# Patient Record
Sex: FEMALE | Race: OTHER | NOT HISPANIC OR LATINO
[De-identification: names, ages, dates, MRNs, and addresses within clinical notes are randomized per-mention and may not be internally consistent; named-entity substitution may affect disease eponyms.]

---

## 2017-03-05 ENCOUNTER — LABORATORY RESULT (OUTPATIENT)
Age: 62
End: 2017-03-05

## 2017-03-06 ENCOUNTER — APPOINTMENT (OUTPATIENT)
Dept: PULMONOLOGY | Facility: CLINIC | Age: 62
End: 2017-03-06

## 2017-03-06 VITALS
WEIGHT: 157 LBS | HEIGHT: 65 IN | OXYGEN SATURATION: 96 % | RESPIRATION RATE: 12 BRPM | SYSTOLIC BLOOD PRESSURE: 108 MMHG | DIASTOLIC BLOOD PRESSURE: 72 MMHG | HEART RATE: 65 BPM | BODY MASS INDEX: 26.16 KG/M2 | TEMPERATURE: 98.6 F

## 2017-03-06 DIAGNOSIS — R06.02 SHORTNESS OF BREATH: ICD-10-CM

## 2017-03-06 DIAGNOSIS — R06.09 OTHER FORMS OF DYSPNEA: ICD-10-CM

## 2017-03-07 LAB
ALBUMIN SERPL ELPH-MCNC: 4.6 G/DL
ALP BLD-CCNC: 97 U/L
ALT SERPL-CCNC: 17 U/L
ANION GAP SERPL CALC-SCNC: 13 MMOL/L
APPEARANCE: CLEAR
AST SERPL-CCNC: 20 U/L
BACTERIA: NEGATIVE
BASOPHILS # BLD AUTO: 0.01 K/UL
BASOPHILS NFR BLD AUTO: 0.2 %
BILIRUB SERPL-MCNC: 0.3 MG/DL
BILIRUBIN URINE: NEGATIVE
BLOOD URINE: NEGATIVE
BUN SERPL-MCNC: 13 MG/DL
CALCIUM SERPL-MCNC: 10 MG/DL
CHLORIDE SERPL-SCNC: 101 MMOL/L
CHOLEST SERPL-MCNC: 229 MG/DL
CHOLEST/HDLC SERPL: 4.2 RATIO
CO2 SERPL-SCNC: 26 MMOL/L
COLOR: YELLOW
CREAT SERPL-MCNC: 0.76 MG/DL
EOSINOPHIL # BLD AUTO: 0.06 K/UL
EOSINOPHIL NFR BLD AUTO: 1 %
GLUCOSE QUALITATIVE U: NORMAL MG/DL
GLUCOSE SERPL-MCNC: 83 MG/DL
HBA1C MFR BLD HPLC: 5.4 %
HCT VFR BLD CALC: 35.2 %
HDLC SERPL-MCNC: 55 MG/DL
HGB BLD-MCNC: 11.3 G/DL
HYALINE CASTS: 0 /LPF
IMM GRANULOCYTES NFR BLD AUTO: 0.2 %
KETONES URINE: NEGATIVE
LDLC SERPL CALC-MCNC: 149 MG/DL
LEUKOCYTE ESTERASE URINE: NEGATIVE
LYMPHOCYTES # BLD AUTO: 2.82 K/UL
LYMPHOCYTES NFR BLD AUTO: 46.3 %
MAN DIFF?: NORMAL
MCHC RBC-ENTMCNC: 21.4 PG
MCHC RBC-ENTMCNC: 32.1 GM/DL
MCV RBC AUTO: 66.8 FL
MICROSCOPIC-UA: NORMAL
MONOCYTES # BLD AUTO: 0.31 K/UL
MONOCYTES NFR BLD AUTO: 5.1 %
NEUTROPHILS # BLD AUTO: 2.88 K/UL
NEUTROPHILS NFR BLD AUTO: 47.2 %
NITRITE URINE: NEGATIVE
PH URINE: 5.5
PLATELET # BLD AUTO: 255 K/UL
POTASSIUM SERPL-SCNC: 4.4 MMOL/L
PROT SERPL-MCNC: 7.4 G/DL
PROTEIN URINE: NEGATIVE MG/DL
RBC # BLD: 5.27 M/UL
RBC # FLD: 15.8 %
RED BLOOD CELLS URINE: 0 /HPF
SODIUM SERPL-SCNC: 140 MMOL/L
SPECIFIC GRAVITY URINE: 1.01
SQUAMOUS EPITHELIAL CELLS: 1 /HPF
T4 SERPL-MCNC: 6.5 UG/DL
TRIGL SERPL-MCNC: 125 MG/DL
TSH SERPL-ACNC: 2.54 UIU/ML
UROBILINOGEN URINE: NORMAL MG/DL
WBC # FLD AUTO: 6.09 K/UL
WHITE BLOOD CELLS URINE: 0 /HPF

## 2017-04-19 ENCOUNTER — APPOINTMENT (OUTPATIENT)
Dept: INTERNAL MEDICINE | Facility: CLINIC | Age: 62
End: 2017-04-19

## 2017-07-17 ENCOUNTER — APPOINTMENT (OUTPATIENT)
Dept: INTERNAL MEDICINE | Facility: CLINIC | Age: 62
End: 2017-07-17

## 2017-07-17 VITALS
WEIGHT: 152 LBS | RESPIRATION RATE: 12 BRPM | DIASTOLIC BLOOD PRESSURE: 72 MMHG | HEART RATE: 67 BPM | SYSTOLIC BLOOD PRESSURE: 117 MMHG | BODY MASS INDEX: 25.33 KG/M2 | TEMPERATURE: 98.5 F | HEIGHT: 65 IN | OXYGEN SATURATION: 96 %

## 2017-07-18 ENCOUNTER — MEDICATION RENEWAL (OUTPATIENT)
Age: 62
End: 2017-07-18

## 2017-07-19 LAB
CHOLEST SERPL-MCNC: 238 MG/DL
CHOLEST/HDLC SERPL: 4.8 RATIO
HDLC SERPL-MCNC: 50 MG/DL
LDLC SERPL CALC-MCNC: 158 MG/DL
TRIGL SERPL-MCNC: 152 MG/DL

## 2018-04-02 ENCOUNTER — APPOINTMENT (OUTPATIENT)
Dept: PULMONOLOGY | Facility: CLINIC | Age: 63
End: 2018-04-02
Payer: MEDICAID

## 2018-04-02 VITALS
TEMPERATURE: 98.2 F | HEIGHT: 65 IN | RESPIRATION RATE: 12 BRPM | WEIGHT: 149 LBS | OXYGEN SATURATION: 97 % | HEART RATE: 89 BPM | SYSTOLIC BLOOD PRESSURE: 121 MMHG | DIASTOLIC BLOOD PRESSURE: 73 MMHG | BODY MASS INDEX: 24.83 KG/M2

## 2018-04-02 DIAGNOSIS — F32.9 MAJOR DEPRESSIVE DISORDER, SINGLE EPISODE, UNSPECIFIED: ICD-10-CM

## 2018-04-02 DIAGNOSIS — Z82.49 FAMILY HISTORY OF ISCHEMIC HEART DISEASE AND OTHER DISEASES OF THE CIRCULATORY SYSTEM: ICD-10-CM

## 2018-04-02 DIAGNOSIS — J98.8 OTHER SPECIFIED RESPIRATORY DISORDERS: ICD-10-CM

## 2018-04-02 DIAGNOSIS — Z87.09 PERSONAL HISTORY OF OTHER DISEASES OF THE RESPIRATORY SYSTEM: ICD-10-CM

## 2018-04-02 PROCEDURE — 99214 OFFICE O/P EST MOD 30 MIN: CPT

## 2019-01-09 ENCOUNTER — LABORATORY RESULT (OUTPATIENT)
Age: 64
End: 2019-01-09

## 2019-01-09 ENCOUNTER — APPOINTMENT (OUTPATIENT)
Dept: INTERNAL MEDICINE | Facility: CLINIC | Age: 64
End: 2019-01-09
Payer: COMMERCIAL

## 2019-01-09 VITALS
HEART RATE: 67 BPM | WEIGHT: 162 LBS | BODY MASS INDEX: 26.99 KG/M2 | OXYGEN SATURATION: 98 % | SYSTOLIC BLOOD PRESSURE: 116 MMHG | DIASTOLIC BLOOD PRESSURE: 80 MMHG | RESPIRATION RATE: 12 BRPM | TEMPERATURE: 98.2 F | HEIGHT: 65 IN

## 2019-01-09 DIAGNOSIS — B35.1 TINEA UNGUIUM: ICD-10-CM

## 2019-01-09 PROCEDURE — 99213 OFFICE O/P EST LOW 20 MIN: CPT | Mod: 25

## 2019-01-09 PROCEDURE — 99397 PER PM REEVAL EST PAT 65+ YR: CPT

## 2019-01-14 LAB
ALBUMIN SERPL ELPH-MCNC: 4.5 G/DL
ALP BLD-CCNC: 85 U/L
ALT SERPL-CCNC: 15 U/L
ANION GAP SERPL CALC-SCNC: 11 MMOL/L
APPEARANCE: CLEAR
AST SERPL-CCNC: 22 U/L
BILIRUB SERPL-MCNC: 0.3 MG/DL
BILIRUBIN URINE: NEGATIVE
BLOOD URINE: NEGATIVE
BUN SERPL-MCNC: 11 MG/DL
CALCIUM SERPL-MCNC: 9.4 MG/DL
CHLORIDE SERPL-SCNC: 103 MMOL/L
CO2 SERPL-SCNC: 25 MMOL/L
COLOR: YELLOW
CREAT SERPL-MCNC: 0.57 MG/DL
GLUCOSE QUALITATIVE U: NEGATIVE MG/DL
GLUCOSE SERPL-MCNC: 72 MG/DL
HBA1C MFR BLD HPLC: 5.4 %
KETONES URINE: NEGATIVE
LEUKOCYTE ESTERASE URINE: NEGATIVE
NITRITE URINE: NEGATIVE
PH URINE: 7.5
POTASSIUM SERPL-SCNC: 5 MMOL/L
PROT SERPL-MCNC: 7.5 G/DL
PROTEIN URINE: NEGATIVE MG/DL
SODIUM SERPL-SCNC: 139 MMOL/L
SPECIFIC GRAVITY URINE: 1.01
TSH SERPL-ACNC: 2.61 UIU/ML
UROBILINOGEN URINE: NEGATIVE MG/DL
VIT B12 SERPL-MCNC: 414 PG/ML

## 2019-01-14 NOTE — PHYSICAL EXAM
[No Acute Distress] : no acute distress [Well Nourished] : well nourished [Well Developed] : well developed [Well-Appearing] : well-appearing [Normal Sclera/Conjunctiva] : normal sclera/conjunctiva [PERRL] : pupils equal round and reactive to light [EOMI] : extraocular movements intact [Normal Outer Ear/Nose] : the outer ears and nose were normal in appearance [Normal Oropharynx] : the oropharynx was normal [No JVD] : no jugular venous distention [Supple] : supple [No Lymphadenopathy] : no lymphadenopathy [Thyroid Normal, No Nodules] : the thyroid was normal and there were no nodules present [No Respiratory Distress] : no respiratory distress  [Clear to Auscultation] : lungs were clear to auscultation bilaterally [No Accessory Muscle Use] : no accessory muscle use [Normal Rate] : normal rate  [Regular Rhythm] : with a regular rhythm [Normal S1, S2] : normal S1 and S2 [No Murmur] : no murmur heard [No Edema] : there was no peripheral edema [No Extremity Clubbing/Cyanosis] : no extremity clubbing/cyanosis [Soft] : abdomen soft [Non Tender] : non-tender [Non-distended] : non-distended [No Masses] : no abdominal mass palpated [No HSM] : no HSM [Normal Bowel Sounds] : normal bowel sounds [Normal Posterior Cervical Nodes] : no posterior cervical lymphadenopathy [Normal Anterior Cervical Nodes] : no anterior cervical lymphadenopathy [No CVA Tenderness] : no CVA  tenderness [No Spinal Tenderness] : no spinal tenderness [No Joint Swelling] : no joint swelling [Grossly Normal Strength/Tone] : grossly normal strength/tone [No Rash] : no rash [Normal Gait] : normal gait [Coordination Grossly Intact] : coordination grossly intact [No Focal Deficits] : no focal deficits [Normal Affect] : the affect was normal [Normal Insight/Judgement] : insight and judgment were intact [de-identified] : rt 4th fingernail onychomycosis

## 2019-01-14 NOTE — HISTORY OF PRESENT ILLNESS
[de-identified] : 62 yo female, with Hx of migraines, osteoporosis, hypercholesterolemia, presents for annual physical.\par States she is doing well. C/o rt fingernail having a little discoloration and splitting that she has noticed for a while now

## 2019-01-14 NOTE — HEALTH RISK ASSESSMENT
[With Family] : lives with family [] :  [# Of Children ___] : has [unfilled] children [Sexually Active] : sexually active [] : No [MammogramDate] : Aug 2017 [PapSmearDate] : > one year ago [BoneDensityDate] : 2017 [ColonoscopyDate] : 3 years ago

## 2019-01-14 NOTE — ASSESSMENT
[FreeTextEntry1] : Physical\par blood work ordered\par Does not want flu vaccine, pneumonia vaccine, shingles vaccine\par referred for mammography, gyn, and to GI for colonoscopy-pt states she is due for repeat colonoscopy in 3 years\par She is UTD with her Bone Density scan\par \par rt 4th finger onychomycosis\par [place] sol'n\par \par  Hx of migraines, osteoporosis, hypercholesterolemia\par cont current meds\par Rxs renewed-see plan\par \par f/u in one week for lab results

## 2019-01-23 LAB
25(OH)D3 SERPL-MCNC: 14.3 NG/ML
CHOLEST SERPL-MCNC: 242 MG/DL
CHOLEST/HDLC SERPL: 4.6 RATIO
HDLC SERPL-MCNC: 53 MG/DL
LDLC SERPL CALC-MCNC: 169 MG/DL
TRIGL SERPL-MCNC: 98 MG/DL

## 2019-01-25 LAB
BASOPHILS # BLD AUTO: 0.03 K/UL
BASOPHILS NFR BLD AUTO: 0.6 %
EOSINOPHIL # BLD AUTO: 0.08 K/UL
EOSINOPHIL NFR BLD AUTO: 1.6 %
HCT VFR BLD CALC: 32.9 %
HGB BLD-MCNC: 10.4 G/DL
IMM GRANULOCYTES NFR BLD AUTO: 0 %
LYMPHOCYTES # BLD AUTO: 2.61 K/UL
LYMPHOCYTES NFR BLD AUTO: 51 %
MAN DIFF?: NORMAL
MCHC RBC-ENTMCNC: 20.6 PG
MCHC RBC-ENTMCNC: 31.6 GM/DL
MCV RBC AUTO: 65.3 FL
MONOCYTES # BLD AUTO: 0.41 K/UL
MONOCYTES NFR BLD AUTO: 8 %
NEUTROPHILS # BLD AUTO: 1.99 K/UL
NEUTROPHILS NFR BLD AUTO: 38.8 %
PLATELET # BLD AUTO: 293 K/UL
RBC # BLD: 5.04 M/UL
RBC # FLD: 15.8 %
WBC # FLD AUTO: 5.12 K/UL

## 2019-05-29 ENCOUNTER — RX RENEWAL (OUTPATIENT)
Age: 64
End: 2019-05-29

## 2019-09-17 ENCOUNTER — LABORATORY RESULT (OUTPATIENT)
Age: 64
End: 2019-09-17

## 2019-09-17 ENCOUNTER — APPOINTMENT (OUTPATIENT)
Dept: INTERNAL MEDICINE | Facility: CLINIC | Age: 64
End: 2019-09-17
Payer: COMMERCIAL

## 2019-09-17 VITALS
SYSTOLIC BLOOD PRESSURE: 120 MMHG | OXYGEN SATURATION: 97 % | BODY MASS INDEX: 26.66 KG/M2 | RESPIRATION RATE: 12 BRPM | HEART RATE: 80 BPM | TEMPERATURE: 98.5 F | DIASTOLIC BLOOD PRESSURE: 68 MMHG | HEIGHT: 65 IN | WEIGHT: 160 LBS

## 2019-09-17 DIAGNOSIS — E78.00 PURE HYPERCHOLESTEROLEMIA, UNSPECIFIED: ICD-10-CM

## 2019-09-17 PROCEDURE — 99214 OFFICE O/P EST MOD 30 MIN: CPT

## 2019-09-18 LAB
APPEARANCE: CLEAR
BILIRUBIN URINE: NEGATIVE
BLOOD URINE: NEGATIVE
COLOR: NORMAL
GLUCOSE QUALITATIVE U: NEGATIVE
KETONES URINE: NEGATIVE
LEUKOCYTE ESTERASE URINE: NEGATIVE
NITRITE URINE: NEGATIVE
PH URINE: 8
PROTEIN URINE: NEGATIVE
SPECIFIC GRAVITY URINE: 1.01
UROBILINOGEN URINE: NORMAL

## 2019-09-26 LAB
ALBUMIN SERPL ELPH-MCNC: 4.4 G/DL
ALP BLD-CCNC: 74 U/L
ALT SERPL-CCNC: 18 U/L
ANION GAP SERPL CALC-SCNC: 15 MMOL/L
AST SERPL-CCNC: 24 U/L
BASOPHILS # BLD AUTO: 0.05 K/UL
BASOPHILS NFR BLD AUTO: 0.7 %
BILIRUB SERPL-MCNC: 0.2 MG/DL
BUN SERPL-MCNC: 10 MG/DL
CALCIUM SERPL-MCNC: 10 MG/DL
CHLORIDE SERPL-SCNC: 103 MMOL/L
CO2 SERPL-SCNC: 24 MMOL/L
CREAT SERPL-MCNC: 0.72 MG/DL
EOSINOPHIL # BLD AUTO: 0.13 K/UL
EOSINOPHIL NFR BLD AUTO: 1.8 %
ESTIMATED AVERAGE GLUCOSE: 108 MG/DL
GLUCOSE SERPL-MCNC: 88 MG/DL
H.PYLORI ANTIBODY IGA: 12.9 UNITS
HBA1C MFR BLD HPLC: 5.4 %
HCT VFR BLD CALC: 34.6 %
HGB BLD-MCNC: 10.7 G/DL
IMM GRANULOCYTES NFR BLD AUTO: 0.1 %
LYMPHOCYTES # BLD AUTO: 3.68 K/UL
LYMPHOCYTES NFR BLD AUTO: 49.6 %
MAN DIFF?: NORMAL
MCHC RBC-ENTMCNC: 20.6 PG
MCHC RBC-ENTMCNC: 30.9 GM/DL
MCV RBC AUTO: 66.7 FL
MONOCYTES # BLD AUTO: 0.51 K/UL
MONOCYTES NFR BLD AUTO: 6.9 %
NEUTROPHILS # BLD AUTO: 3.04 K/UL
NEUTROPHILS NFR BLD AUTO: 40.9 %
PLATELET # BLD AUTO: 275 K/UL
POTASSIUM SERPL-SCNC: 4.3 MMOL/L
PROT SERPL-MCNC: 7.3 G/DL
RBC # BLD: 5.19 M/UL
RBC # FLD: 15.8 %
SODIUM SERPL-SCNC: 142 MMOL/L
WBC # FLD AUTO: 7.42 K/UL

## 2019-09-26 NOTE — PHYSICAL EXAM
[Normal Sclera/Conjunctiva] : normal sclera/conjunctiva [Normal Outer Ear/Nose] : the outer ears and nose were normal in appearance [No JVD] : no jugular venous distention [Normal] : normal rate, regular rhythm, normal S1 and S2 and no murmur heard [No Edema] : there was no peripheral edema [Soft] : abdomen soft [Non Tender] : non-tender [Non-distended] : non-distended [Normal Anterior Cervical Nodes] : no anterior cervical lymphadenopathy [No CVA Tenderness] : no CVA  tenderness [No Joint Swelling] : no joint swelling [Grossly Normal Strength/Tone] : grossly normal strength/tone [No Rash] : no rash [No Focal Deficits] : no focal deficits [Normal Gait] : normal gait [Normal Affect] : the affect was normal [Alert and Oriented x3] : oriented to person, place, and time [Normal Insight/Judgement] : insight and judgment were intact

## 2019-09-26 NOTE — HISTORY OF PRESENT ILLNESS
[de-identified] : 62 yo female, with hx of hypercholesterolemia, osteoporosis, migraines, thalassemia trait, presents for follow up visit and blood work\par Pt states that she took lipitor only for a short while and stopped it. She is following a low fat/low cholesterol diet\par Also states that last week she had bloating and also says she had dark discoloration in her urine and had some dysuria that lasted about 3 days. Now she says the symptoms have gone away\par She also says she developed a yeast infection. Had Diflucan at home and she took it

## 2019-10-10 LAB
CHOLEST SERPL-MCNC: 240 MG/DL
CHOLEST/HDLC SERPL: 4.8 RATIO
HDLC SERPL-MCNC: 50 MG/DL
LDLC SERPL CALC-MCNC: 135 MG/DL
TRIGL SERPL-MCNC: 274 MG/DL

## 2020-07-14 ENCOUNTER — APPOINTMENT (OUTPATIENT)
Dept: OBGYN | Facility: CLINIC | Age: 65
End: 2020-07-14
Payer: MEDICARE

## 2020-07-14 VITALS
WEIGHT: 148.13 LBS | SYSTOLIC BLOOD PRESSURE: 122 MMHG | HEART RATE: 89 BPM | BODY MASS INDEX: 24.68 KG/M2 | TEMPERATURE: 98.9 F | HEIGHT: 65 IN | DIASTOLIC BLOOD PRESSURE: 78 MMHG

## 2020-07-14 DIAGNOSIS — B97.7 PAPILLOMAVIRUS AS THE CAUSE OF DISEASES CLASSIFIED ELSEWHERE: ICD-10-CM

## 2020-07-14 DIAGNOSIS — R92.2 INCONCLUSIVE MAMMOGRAM: ICD-10-CM

## 2020-07-14 PROCEDURE — 99203 OFFICE O/P NEW LOW 30 MIN: CPT

## 2020-07-14 NOTE — HISTORY OF PRESENT ILLNESS
[___ Year(s) Ago] : [unfilled] year(s) ago [Regular Exercise] : regular exercise [Fair] : being in fair health [Weight Concerns] : no concerns with her weight [Healthy Diet] : a healthy diet [Last Pap Smear ___] : last Papanicolaou cytology done [unfilled] [Last Bone Density ___] : Last bone density studies [unfilled] [Last Colonoscopy ___] : last colonoscopy done [unfilled] [Last Mammogram ___] : last mammogram done [unfilled] [Vaginal Itching] : vaginal itching [Postmenopausal] : is postmenopausal [Sexually Active] : is sexually active [Dyspareunia] : dyspareunia [___ Months] : that started [unfilled] months ago [6/10] : described as 6/10 in severity

## 2020-07-14 NOTE — PHYSICAL EXAM
[Awake] : awake [Alert] : alert [Acute Distress] : no acute distress [Mass] : no breast mass [Nipple Discharge] : no nipple discharge [Axillary LAD] : no axillary lymphadenopathy [Soft] : soft [Oriented x3] : oriented to person, place, and time [Tender] : non tender [Atrophy] : atrophy [Normal] : cervix [Uterine Adnexae] : were not tender and not enlarged [No Bleeding] : there was no active vaginal bleeding [Pap Obtained] : a Pap smear was performed

## 2020-07-16 LAB — HPV HIGH+LOW RISK DNA PNL CVX: NOT DETECTED

## 2020-07-20 LAB — CYTOLOGY CVX/VAG DOC THIN PREP: ABNORMAL

## 2020-08-12 ENCOUNTER — LABORATORY RESULT (OUTPATIENT)
Age: 65
End: 2020-08-12

## 2020-08-12 ENCOUNTER — APPOINTMENT (OUTPATIENT)
Dept: INTERNAL MEDICINE | Facility: CLINIC | Age: 65
End: 2020-08-12
Payer: MEDICARE

## 2020-08-12 VITALS
WEIGHT: 144 LBS | BODY MASS INDEX: 23.99 KG/M2 | RESPIRATION RATE: 12 BRPM | TEMPERATURE: 97.8 F | HEART RATE: 80 BPM | SYSTOLIC BLOOD PRESSURE: 113 MMHG | HEIGHT: 65 IN | DIASTOLIC BLOOD PRESSURE: 77 MMHG | OXYGEN SATURATION: 96 %

## 2020-08-12 DIAGNOSIS — Z00.00 ENCOUNTER FOR GENERAL ADULT MEDICAL EXAMINATION W/OUT ABNORMAL FINDINGS: ICD-10-CM

## 2020-08-12 PROCEDURE — 99397 PER PM REEVAL EST PAT 65+ YR: CPT

## 2020-08-12 RX ORDER — CICLOPIROX 80 MG/ML
8 SOLUTION TOPICAL
Qty: 1 | Refills: 3 | Status: DISCONTINUED | COMMUNITY
Start: 2019-01-09 | End: 2020-08-12

## 2020-08-12 RX ORDER — DICYCLOMINE HYDROCHLORIDE 20 MG/1
20 TABLET ORAL TWICE DAILY
Qty: 60 | Refills: 3 | Status: DISCONTINUED | COMMUNITY
Start: 2019-01-09 | End: 2020-08-12

## 2020-08-12 RX ORDER — ALPRAZOLAM 0.25 MG/1
0.25 TABLET ORAL
Qty: 90 | Refills: 0 | Status: DISCONTINUED | COMMUNITY
Start: 2017-03-06 | End: 2020-08-12

## 2020-08-12 RX ORDER — FLUCONAZOLE 150 MG/1
150 TABLET ORAL
Qty: 3 | Refills: 3 | Status: DISCONTINUED | COMMUNITY
Start: 2019-09-17 | End: 2020-08-12

## 2020-08-12 RX ORDER — AMOXICILLIN AND CLAVULANATE POTASSIUM 500; 125 MG/1; MG/1
500-125 TABLET, FILM COATED ORAL
Qty: 14 | Refills: 0 | Status: DISCONTINUED | COMMUNITY
Start: 2018-04-02 | End: 2020-08-12

## 2020-08-12 RX ORDER — HYDROCODONE BITARTRATE AND HOMATROPINE METHYLBROMIDE 5; 1.5 MG/5ML; MG/5ML
5-1.5 SYRUP ORAL EVERY 8 HOURS
Qty: 480 | Refills: 0 | Status: DISCONTINUED | COMMUNITY
Start: 2018-04-02 | End: 2020-08-12

## 2020-08-12 RX ORDER — ATORVASTATIN CALCIUM 10 MG/1
10 TABLET, FILM COATED ORAL
Qty: 90 | Refills: 0 | Status: DISCONTINUED | COMMUNITY
Start: 2019-01-24 | End: 2020-08-12

## 2020-08-12 RX ORDER — DICLOFENAC SODIUM 10 MG/G
1 GEL TOPICAL DAILY
Qty: 60 | Refills: 3 | Status: DISCONTINUED | COMMUNITY
Start: 2017-03-06 | End: 2020-08-12

## 2020-08-12 NOTE — PHYSICAL EXAM
[No Acute Distress] : no acute distress [Well Nourished] : well nourished [Well Developed] : well developed [Well-Appearing] : well-appearing [Normal Sclera/Conjunctiva] : normal sclera/conjunctiva [EOMI] : extraocular movements intact [PERRL] : pupils equal round and reactive to light [Normal Oropharynx] : the oropharynx was normal [Normal Outer Ear/Nose] : the outer ears and nose were normal in appearance [No JVD] : no jugular venous distention [No Lymphadenopathy] : no lymphadenopathy [Supple] : supple [Thyroid Normal, No Nodules] : the thyroid was normal and there were no nodules present [No Respiratory Distress] : no respiratory distress  [No Accessory Muscle Use] : no accessory muscle use [Clear to Auscultation] : lungs were clear to auscultation bilaterally [Normal Rate] : normal rate  [Regular Rhythm] : with a regular rhythm [No Murmur] : no murmur heard [Normal S1, S2] : normal S1 and S2 [No Edema] : there was no peripheral edema [Pedal Pulses Present] : the pedal pulses are present [No Extremity Clubbing/Cyanosis] : no extremity clubbing/cyanosis [Non-distended] : non-distended [Non Tender] : non-tender [Soft] : abdomen soft [No HSM] : no HSM [No Masses] : no abdominal mass palpated [Normal Anterior Cervical Nodes] : no anterior cervical lymphadenopathy [Normal Posterior Cervical Nodes] : no posterior cervical lymphadenopathy [Normal Bowel Sounds] : normal bowel sounds [No CVA Tenderness] : no CVA  tenderness [No Spinal Tenderness] : no spinal tenderness [No Joint Swelling] : no joint swelling [Grossly Normal Strength/Tone] : grossly normal strength/tone [No Rash] : no rash [Normal Gait] : normal gait [No Focal Deficits] : no focal deficits [Coordination Grossly Intact] : coordination grossly intact [Normal Affect] : the affect was normal [Alert and Oriented x3] : oriented to person, place, and time [Normal Insight/Judgement] : insight and judgment were intact [Normal] : affect was normal and insight and judgment were intact

## 2020-08-22 LAB
25(OH)D3 SERPL-MCNC: 35.7 NG/ML
ALBUMIN SERPL ELPH-MCNC: 4.6 G/DL
ALP BLD-CCNC: 71 U/L
ALT SERPL-CCNC: 15 U/L
ANION GAP SERPL CALC-SCNC: 16 MMOL/L
APPEARANCE: CLEAR
AST SERPL-CCNC: 21 U/L
BILIRUB SERPL-MCNC: 0.4 MG/DL
BILIRUBIN URINE: NEGATIVE
BLOOD URINE: NEGATIVE
BUN SERPL-MCNC: 6 MG/DL
CALCIUM SERPL-MCNC: 9.5 MG/DL
CHLORIDE SERPL-SCNC: 100 MMOL/L
CHOLEST SERPL-MCNC: 230 MG/DL
CHOLEST/HDLC SERPL: 4.4 RATIO
CO2 SERPL-SCNC: 22 MMOL/L
COLOR: NORMAL
CREAT SERPL-MCNC: 0.72 MG/DL
ESTIMATED AVERAGE GLUCOSE: 105 MG/DL
GLUCOSE QUALITATIVE U: NEGATIVE
GLUCOSE SERPL-MCNC: 74 MG/DL
HBA1C MFR BLD HPLC: 5.3 %
HDLC SERPL-MCNC: 52 MG/DL
KETONES URINE: NEGATIVE
LDLC SERPL CALC-MCNC: 154 MG/DL
LEUKOCYTE ESTERASE URINE: ABNORMAL
NITRITE URINE: NEGATIVE
PH URINE: 7.5
POTASSIUM SERPL-SCNC: 4.2 MMOL/L
PROT SERPL-MCNC: 7.1 G/DL
PROTEIN URINE: NEGATIVE
SARS-COV-2 IGG SERPL IA-ACNC: <0.1 INDEX
SARS-COV-2 IGG SERPL QL IA: NEGATIVE
SODIUM SERPL-SCNC: 138 MMOL/L
SPECIFIC GRAVITY URINE: 1.01
TRIGL SERPL-MCNC: 124 MG/DL
TSH SERPL-ACNC: 2.07 UIU/ML
UROBILINOGEN URINE: NORMAL
VIT B12 SERPL-MCNC: 669 PG/ML

## 2020-08-22 NOTE — HEALTH RISK ASSESSMENT
[No] : No [With Family] : lives with family [] :  [# Of Children ___] : has [unfilled] children [Sexually Active] : sexually active [] : No [MammogramDate] : Jan 2019 [BoneDensityDate] : 2017 [MammogramComments] : Has sofia't Sept 3rd [PapSmearDate] : last month [ColonoscopyDate] : 4 years ago

## 2020-08-22 NOTE — HISTORY OF PRESENT ILLNESS
[de-identified] : Ms. LUIS CARLOS RUDOLPH is a 64 year old female, with hx of hypercholesterolemia, osteoporosis, migraines, presents for annual physical\par She is doing well. Pt states she never took the atorvastatin for her cholesterol. She is following a low fat low cholesterol diet\par Denies SOB, chest pain, abdominal pain, N/V/D, leg swelling, headache, dizziness \par Offers no complaints\par

## 2020-08-22 NOTE — ASSESSMENT
[FreeTextEntry1] : Physical\par referred for mammography and for Bone density scan\par She is UTD with her colonoscopy\par blood work ordered\par \par  hx of hypercholesterolemia\par cont low fat/low chol diet\par \par Hx of osteoporosis\par cont vitamins, pt does not want fosamax at this time\par \par Hx of migraines\par cont imitrex prn-renewed today\par \par follow up in one week for lab results\par

## 2020-08-28 LAB
BASOPHILS # BLD AUTO: 0.1 K/UL
BASOPHILS NFR BLD AUTO: 1.8 %
EOSINOPHIL # BLD AUTO: 0.05 K/UL
EOSINOPHIL NFR BLD AUTO: 0.9 %
HCT VFR BLD CALC: 33.7 %
HGB BLD-MCNC: 10.6 G/DL
LYMPHOCYTES # BLD AUTO: 2.79 K/UL
LYMPHOCYTES NFR BLD AUTO: 48.2 %
MAN DIFF?: NORMAL
MCHC RBC-ENTMCNC: 20.9 PG
MCHC RBC-ENTMCNC: 31.5 GM/DL
MCV RBC AUTO: 66.6 FL
MONOCYTES # BLD AUTO: 0.27 K/UL
MONOCYTES NFR BLD AUTO: 4.6 %
NEUTROPHILS # BLD AUTO: 2.58 K/UL
NEUTROPHILS NFR BLD AUTO: 44.5 %
PLATELET # BLD AUTO: 261 K/UL
RBC # BLD: 5.06 M/UL
RBC # FLD: 16.7 %
WBC # FLD AUTO: 5.79 K/UL

## 2020-08-31 ENCOUNTER — LABORATORY RESULT (OUTPATIENT)
Age: 65
End: 2020-08-31

## 2020-09-03 ENCOUNTER — RESULT REVIEW (OUTPATIENT)
Age: 65
End: 2020-09-03

## 2020-09-03 ENCOUNTER — APPOINTMENT (OUTPATIENT)
Dept: MAMMOGRAPHY | Facility: IMAGING CENTER | Age: 65
End: 2020-09-03
Payer: MEDICARE

## 2020-09-03 ENCOUNTER — APPOINTMENT (OUTPATIENT)
Dept: ULTRASOUND IMAGING | Facility: IMAGING CENTER | Age: 65
End: 2020-09-03
Payer: MEDICARE

## 2020-09-03 ENCOUNTER — APPOINTMENT (OUTPATIENT)
Dept: RADIOLOGY | Facility: IMAGING CENTER | Age: 65
End: 2020-09-03
Payer: MEDICARE

## 2020-09-03 ENCOUNTER — OUTPATIENT (OUTPATIENT)
Dept: OUTPATIENT SERVICES | Facility: HOSPITAL | Age: 65
LOS: 1 days | End: 2020-09-03
Payer: MEDICARE

## 2020-09-03 DIAGNOSIS — Z00.8 ENCOUNTER FOR OTHER GENERAL EXAMINATION: ICD-10-CM

## 2020-09-03 PROCEDURE — 77080 DXA BONE DENSITY AXIAL: CPT | Mod: 26

## 2020-09-03 PROCEDURE — 77063 BREAST TOMOSYNTHESIS BI: CPT | Mod: 26

## 2020-09-03 PROCEDURE — 77067 SCR MAMMO BI INCL CAD: CPT

## 2020-09-03 PROCEDURE — 77063 BREAST TOMOSYNTHESIS BI: CPT

## 2020-09-03 PROCEDURE — 76641 ULTRASOUND BREAST COMPLETE: CPT | Mod: 26,50

## 2020-09-03 PROCEDURE — 77067 SCR MAMMO BI INCL CAD: CPT | Mod: 26

## 2020-09-03 PROCEDURE — 76641 ULTRASOUND BREAST COMPLETE: CPT

## 2020-09-03 PROCEDURE — 77080 DXA BONE DENSITY AXIAL: CPT

## 2020-09-17 RX ORDER — SULFAMETHOXAZOLE AND TRIMETHOPRIM 800; 160 MG/1; MG/1
800-160 TABLET ORAL
Qty: 14 | Refills: 0 | Status: ACTIVE | COMMUNITY
Start: 2020-09-17 | End: 1900-01-01

## 2020-09-23 LAB
APPEARANCE: CLEAR
BACTERIA: NEGATIVE
BASOPHILS # BLD AUTO: 0.16 K/UL
BASOPHILS NFR BLD AUTO: 2.6 %
BILIRUBIN URINE: NEGATIVE
BLOOD URINE: NEGATIVE
COLOR: NORMAL
EOSINOPHIL # BLD AUTO: 0.11 K/UL
EOSINOPHIL NFR BLD AUTO: 1.8 %
FERRITIN SERPL-MCNC: 79 NG/ML
GLUCOSE QUALITATIVE U: NEGATIVE
HCT VFR BLD CALC: 33.7 %
HGB BLD-MCNC: 10.6 G/DL
HYALINE CASTS: 0 /LPF
IRON SATN MFR SERPL: 43 %
IRON SERPL-MCNC: 126 UG/DL
KETONES URINE: NEGATIVE
LEUKOCYTE ESTERASE URINE: NEGATIVE
LYMPHOCYTES # BLD AUTO: 2.51 K/UL
LYMPHOCYTES NFR BLD AUTO: 39.8 %
MAN DIFF?: NORMAL
MCHC RBC-ENTMCNC: 20.9 PG
MCHC RBC-ENTMCNC: 31.5 GM/DL
MCV RBC AUTO: 66.5 FL
MICROSCOPIC-UA: NORMAL
MONOCYTES # BLD AUTO: 0.17 K/UL
MONOCYTES NFR BLD AUTO: 2.7 %
NEUTROPHILS # BLD AUTO: 3.35 K/UL
NEUTROPHILS NFR BLD AUTO: 53.1 %
NITRITE URINE: NEGATIVE
PH URINE: 7.5
PLATELET # BLD AUTO: 261 K/UL
PROTEIN URINE: NEGATIVE
RBC # BLD: 5.07 M/UL
RBC # FLD: 16.8 %
RED BLOOD CELLS URINE: 1 /HPF
SPECIFIC GRAVITY URINE: 1.01
SQUAMOUS EPITHELIAL CELLS: 1 /HPF
TIBC SERPL-MCNC: 291 UG/DL
UIBC SERPL-MCNC: 166 UG/DL
UROBILINOGEN URINE: NORMAL
WBC # FLD AUTO: 6.3 K/UL
WHITE BLOOD CELLS URINE: 0 /HPF

## 2020-10-05 DIAGNOSIS — R82.90 UNSPECIFIED ABNORMAL FINDINGS IN URINE: ICD-10-CM

## 2020-10-06 ENCOUNTER — APPOINTMENT (OUTPATIENT)
Dept: INTERNAL MEDICINE | Facility: CLINIC | Age: 65
End: 2020-10-06
Payer: MEDICARE

## 2020-10-06 VITALS
HEART RATE: 73 BPM | WEIGHT: 144 LBS | DIASTOLIC BLOOD PRESSURE: 82 MMHG | RESPIRATION RATE: 12 BRPM | SYSTOLIC BLOOD PRESSURE: 133 MMHG | BODY MASS INDEX: 23.96 KG/M2 | OXYGEN SATURATION: 96 % | TEMPERATURE: 97.5 F

## 2020-10-06 DIAGNOSIS — L85.3 XEROSIS CUTIS: ICD-10-CM

## 2020-10-06 DIAGNOSIS — Z87.440 PERSONAL HISTORY OF URINARY (TRACT) INFECTIONS: ICD-10-CM

## 2020-10-06 DIAGNOSIS — Z23 ENCOUNTER FOR IMMUNIZATION: ICD-10-CM

## 2020-10-06 PROCEDURE — 99214 OFFICE O/P EST MOD 30 MIN: CPT

## 2020-10-06 RX ORDER — ALENDRONATE SODIUM 70 MG/1
70 TABLET ORAL
Qty: 4 | Refills: 3 | Status: ACTIVE | COMMUNITY
Start: 2020-10-06 | End: 1900-01-01

## 2020-10-06 RX ORDER — HYDROCORTISONE 1 %
12 CREAM (GRAM) TOPICAL TWICE DAILY
Qty: 1 | Refills: 5 | Status: ACTIVE | COMMUNITY
Start: 2020-10-06 | End: 1900-01-01

## 2020-10-06 NOTE — PHYSICAL EXAM
[No Acute Distress] : no acute distress [Well Nourished] : well nourished [Well Developed] : well developed [Well-Appearing] : well-appearing [Normal Sclera/Conjunctiva] : normal sclera/conjunctiva [PERRL] : pupils equal round and reactive to light [EOMI] : extraocular movements intact [Normal Outer Ear/Nose] : the outer ears and nose were normal in appearance [Normal Oropharynx] : the oropharynx was normal [No JVD] : no jugular venous distention [No Lymphadenopathy] : no lymphadenopathy [Supple] : supple [No Respiratory Distress] : no respiratory distress  [No Accessory Muscle Use] : no accessory muscle use [Clear to Auscultation] : lungs were clear to auscultation bilaterally [Normal Rate] : normal rate  [Regular Rhythm] : with a regular rhythm [Normal S1, S2] : normal S1 and S2 [No Murmur] : no murmur heard [Pedal Pulses Present] : the pedal pulses are present [No Edema] : there was no peripheral edema [No Extremity Clubbing/Cyanosis] : no extremity clubbing/cyanosis [Soft] : abdomen soft [Non Tender] : non-tender [Non-distended] : non-distended [No Masses] : no abdominal mass palpated [No HSM] : no HSM [Normal Bowel Sounds] : normal bowel sounds [Normal Posterior Cervical Nodes] : no posterior cervical lymphadenopathy [Normal Anterior Cervical Nodes] : no anterior cervical lymphadenopathy [No CVA Tenderness] : no CVA  tenderness [No Spinal Tenderness] : no spinal tenderness [No Joint Swelling] : no joint swelling [Grossly Normal Strength/Tone] : grossly normal strength/tone [No Rash] : no rash [Coordination Grossly Intact] : coordination grossly intact [No Focal Deficits] : no focal deficits [Normal Gait] : normal gait [Normal Affect] : the affect was normal [Alert and Oriented x3] : oriented to person, place, and time [Normal Insight/Judgement] : insight and judgment were intact

## 2020-10-07 NOTE — ASSESSMENT
[FreeTextEntry1] : imaging reviewed with pt\par \par Mammography and US- no masses\par Bone Density scan + osteoporosis in the spine\par start Fosamax 70mg po weekly, vit D and Calcium daily\par \par + UTI  on last UA \par pt asymptomatic. Stopped Bactrim-says she could not tolerate it\par we'll repeat UA and urine culture today\par \par Tdap vaccine ordered\par \par dry skin\par lachydrin \par \par

## 2020-10-07 NOTE — HISTORY OF PRESENT ILLNESS
[de-identified] : Ms. LUIS CARLOS RUDOLPH is a 64 year old female presents for follow up visit for UTI and to discuss imaging results\par lUrine culture was positive for E. coli-pt was started on Bactrim but stopped it 3 days later because she says was giving her tachycardia. Denies having any UTI symptoms\par Denies fever, chills, SOB, chest pain, abdominal pain, N/V/D, F/U/D leg swelling, headache, dizziness \par Also needs Tdap vaccine ( pt says her daughter is having a baby this week and her last Tdap was over 10 years ago )\par \par \par \par

## 2020-10-12 ENCOUNTER — APPOINTMENT (OUTPATIENT)
Dept: INTERNAL MEDICINE | Facility: CLINIC | Age: 65
End: 2020-10-12

## 2020-10-14 LAB — BACTERIA UR CULT: ABNORMAL

## 2020-10-16 LAB
APPEARANCE: CLEAR
BILIRUBIN URINE: NEGATIVE
BLOOD URINE: NEGATIVE
COLOR: NORMAL
GLUCOSE QUALITATIVE U: NEGATIVE
KETONES URINE: NEGATIVE
LEUKOCYTE ESTERASE URINE: NEGATIVE
NITRITE URINE: NEGATIVE
PH URINE: 7.5
PROTEIN URINE: NEGATIVE
SPECIFIC GRAVITY URINE: 1
UROBILINOGEN URINE: NORMAL

## 2020-12-23 PROBLEM — Z87.440 HISTORY OF URINARY TRACT INFECTION: Status: RESOLVED | Noted: 2020-10-06 | Resolved: 2020-12-23

## 2021-10-14 ENCOUNTER — RX RENEWAL (OUTPATIENT)
Age: 66
End: 2021-10-14

## 2023-10-17 ENCOUNTER — APPOINTMENT (OUTPATIENT)
Dept: INTERNAL MEDICINE | Facility: CLINIC | Age: 68
End: 2023-10-17

## 2023-11-16 ENCOUNTER — LABORATORY RESULT (OUTPATIENT)
Age: 68
End: 2023-11-16

## 2023-11-16 ENCOUNTER — APPOINTMENT (OUTPATIENT)
Dept: FAMILY MEDICINE | Facility: CLINIC | Age: 68
End: 2023-11-16
Payer: MEDICARE

## 2023-11-16 VITALS
HEIGHT: 65 IN | WEIGHT: 140 LBS | BODY MASS INDEX: 23.32 KG/M2 | DIASTOLIC BLOOD PRESSURE: 76 MMHG | RESPIRATION RATE: 14 BRPM | SYSTOLIC BLOOD PRESSURE: 122 MMHG | OXYGEN SATURATION: 95 % | TEMPERATURE: 97.7 F | HEART RATE: 86 BPM

## 2023-11-16 DIAGNOSIS — G43.909 MIGRAINE, UNSPECIFIED, NOT INTRACTABLE, W/OUT STATUS MIGRAINOSUS: ICD-10-CM

## 2023-11-16 DIAGNOSIS — J38.2 NODULES OF VOCAL CORDS: ICD-10-CM

## 2023-11-16 DIAGNOSIS — D56.9 THALASSEMIA, UNSPECIFIED: ICD-10-CM

## 2023-11-16 DIAGNOSIS — M81.0 AGE-RELATED OSTEOPOROSIS W/OUT CURRENT PATHOLOGICAL FRACTURE: ICD-10-CM

## 2023-11-16 PROCEDURE — 99204 OFFICE O/P NEW MOD 45 MIN: CPT | Mod: 25

## 2023-11-16 PROCEDURE — 36415 COLL VENOUS BLD VENIPUNCTURE: CPT

## 2023-11-21 LAB
ALBUMIN SERPL ELPH-MCNC: 4.3 G/DL
ALP BLD-CCNC: 86 U/L
ALT SERPL-CCNC: 11 U/L
ANION GAP SERPL CALC-SCNC: 10 MMOL/L
AST SERPL-CCNC: 15 U/L
BASOPHILS # BLD AUTO: 0.05 K/UL
BASOPHILS NFR BLD AUTO: 0.9 %
BILIRUB SERPL-MCNC: 0.4 MG/DL
BUN SERPL-MCNC: 11 MG/DL
CALCIUM SERPL-MCNC: 9.5 MG/DL
CHLORIDE SERPL-SCNC: 106 MMOL/L
CHOLEST SERPL-MCNC: 211 MG/DL
CO2 SERPL-SCNC: 26 MMOL/L
CREAT SERPL-MCNC: 0.6 MG/DL
EGFR: 96 ML/MIN/1.73M2
EOSINOPHIL # BLD AUTO: 0.17 K/UL
EOSINOPHIL NFR BLD AUTO: 3 %
ESTIMATED AVERAGE GLUCOSE: 108 MG/DL
GLUCOSE SERPL-MCNC: 90 MG/DL
HBA1C MFR BLD HPLC: 5.4 %
HCT VFR BLD CALC: 34 %
HDLC SERPL-MCNC: 58 MG/DL
HGB BLD-MCNC: 10.6 G/DL
IMM GRANULOCYTES NFR BLD AUTO: 0.2 %
LDLC SERPL CALC-MCNC: 135 MG/DL
LYMPHOCYTES # BLD AUTO: 2.46 K/UL
LYMPHOCYTES NFR BLD AUTO: 42.7 %
MAN DIFF?: NORMAL
MCHC RBC-ENTMCNC: 20.5 PG
MCHC RBC-ENTMCNC: 31.2 GM/DL
MCV RBC AUTO: 65.9 FL
MONOCYTES # BLD AUTO: 0.45 K/UL
MONOCYTES NFR BLD AUTO: 7.8 %
NEUTROPHILS # BLD AUTO: 2.62 K/UL
NEUTROPHILS NFR BLD AUTO: 45.4 %
NONHDLC SERPL-MCNC: 153 MG/DL
PLATELET # BLD AUTO: 280 K/UL
POTASSIUM SERPL-SCNC: 4.7 MMOL/L
PROT SERPL-MCNC: 7.1 G/DL
RBC # BLD: 5.16 M/UL
RBC # FLD: 18 %
SODIUM SERPL-SCNC: 141 MMOL/L
TRIGL SERPL-MCNC: 99 MG/DL
TSH SERPL-ACNC: 2.46 UIU/ML
VIT B12 SERPL-MCNC: 341 PG/ML
WBC # FLD AUTO: 5.76 K/UL

## 2024-02-15 ENCOUNTER — APPOINTMENT (OUTPATIENT)
Dept: FAMILY MEDICINE | Facility: CLINIC | Age: 69
End: 2024-02-15

## 2024-03-06 ENCOUNTER — LABORATORY RESULT (OUTPATIENT)
Age: 69
End: 2024-03-06

## 2024-03-06 ENCOUNTER — APPOINTMENT (OUTPATIENT)
Dept: FAMILY MEDICINE | Facility: CLINIC | Age: 69
End: 2024-03-06
Payer: MEDICARE

## 2024-03-06 VITALS
HEIGHT: 65 IN | SYSTOLIC BLOOD PRESSURE: 122 MMHG | TEMPERATURE: 97.3 F | HEART RATE: 74 BPM | OXYGEN SATURATION: 95 % | DIASTOLIC BLOOD PRESSURE: 77 MMHG | BODY MASS INDEX: 24.16 KG/M2 | WEIGHT: 145 LBS | RESPIRATION RATE: 14 BRPM

## 2024-03-06 DIAGNOSIS — E78.5 HYPERLIPIDEMIA, UNSPECIFIED: ICD-10-CM

## 2024-03-06 DIAGNOSIS — K21.9 GASTRO-ESOPHAGEAL REFLUX DISEASE W/OUT ESOPHAGITIS: ICD-10-CM

## 2024-03-06 DIAGNOSIS — F41.9 ANXIETY DISORDER, UNSPECIFIED: ICD-10-CM

## 2024-03-06 DIAGNOSIS — H26.9 UNSPECIFIED CATARACT: ICD-10-CM

## 2024-03-06 DIAGNOSIS — D64.9 ANEMIA, UNSPECIFIED: ICD-10-CM

## 2024-03-06 PROCEDURE — G2211 COMPLEX E/M VISIT ADD ON: CPT

## 2024-03-06 PROCEDURE — 99214 OFFICE O/P EST MOD 30 MIN: CPT

## 2024-03-06 PROCEDURE — 36415 COLL VENOUS BLD VENIPUNCTURE: CPT

## 2024-03-06 RX ORDER — ALPRAZOLAM 0.25 MG/1
0.25 TABLET ORAL TWICE DAILY
Qty: 10 | Refills: 0 | Status: ACTIVE | COMMUNITY
Start: 2024-03-06 | End: 1900-01-01

## 2024-03-06 RX ORDER — FAMOTIDINE 20 MG/1
20 TABLET, FILM COATED ORAL
Qty: 90 | Refills: 1 | Status: ACTIVE | COMMUNITY
Start: 2024-03-06 | End: 1900-01-01

## 2024-03-06 RX ORDER — OMEPRAZOLE 20 MG/1
20 CAPSULE, DELAYED RELEASE ORAL
Qty: 90 | Refills: 0 | Status: ACTIVE | COMMUNITY
Start: 2019-01-09 | End: 1900-01-01

## 2024-03-06 NOTE — HEALTH RISK ASSESSMENT
[0] : 1) Little interest or pleasure doing things: Not at all (0) [1] : 2) Feeling down, depressed, or hopeless for several days (1) [PHQ-2 Negative - No further assessment needed] : PHQ-2 Negative - No further assessment needed [[id number]] : 1

## 2024-03-06 NOTE — PLAN
[FreeTextEntry1] : GERD- Omeprazole 20mg for 2 weeks episodically discussed. Famotidine prn Migraines- Imitrex prn HLD- fasting lipids today, diet discussed Dense breasts- needs repeat Mammo/US Anemia- likely Thalassemia- will monitor

## 2024-03-06 NOTE — HISTORY OF PRESENT ILLNESS
[de-identified] : 68 year old female with past medical history of Depression, Migraines, Thalassemia, Osteoporosis, Cataracts, HLD and throat nodules (neg biopsy in the past) presents for FU.  She c/o itchy dry skin of her legs.  She does not want any treatment for Osteoporosis despite discussing options.  She reports her anxiety and depression is manageable. She reports heartburn when stressed but does not take any medications.  She reports migraines once every few months with improvement on imitrex.

## 2024-03-07 LAB
BASOPHILS # BLD AUTO: 0.04 K/UL
BASOPHILS NFR BLD AUTO: 0.7 %
CHOLEST SERPL-MCNC: 237 MG/DL
EOSINOPHIL # BLD AUTO: 0.13 K/UL
EOSINOPHIL NFR BLD AUTO: 2.3 %
HCT VFR BLD CALC: 34.4 %
HDLC SERPL-MCNC: 60 MG/DL
HGB BLD-MCNC: 10.7 G/DL
IMM GRANULOCYTES NFR BLD AUTO: 0.2 %
LDLC SERPL CALC-MCNC: 167 MG/DL
LYMPHOCYTES # BLD AUTO: 2.47 K/UL
LYMPHOCYTES NFR BLD AUTO: 43.3 %
MAN DIFF?: NORMAL
MCHC RBC-ENTMCNC: 20.3 PG
MCHC RBC-ENTMCNC: 31.1 GM/DL
MCV RBC AUTO: 65.4 FL
MONOCYTES # BLD AUTO: 0.37 K/UL
MONOCYTES NFR BLD AUTO: 6.5 %
NEUTROPHILS # BLD AUTO: 2.68 K/UL
NEUTROPHILS NFR BLD AUTO: 47 %
NONHDLC SERPL-MCNC: 177 MG/DL
PLATELET # BLD AUTO: 295 K/UL
RBC # BLD: 5.26 M/UL
RBC # FLD: 16.5 %
TRIGL SERPL-MCNC: 62 MG/DL
WBC # FLD AUTO: 5.7 K/UL

## 2024-03-07 RX ORDER — ROSUVASTATIN CALCIUM 5 MG/1
5 TABLET, FILM COATED ORAL
Qty: 90 | Refills: 1 | Status: ACTIVE | COMMUNITY
Start: 2024-03-07 | End: 1900-01-01

## 2024-03-22 ENCOUNTER — RX RENEWAL (OUTPATIENT)
Age: 69
End: 2024-03-22

## 2024-05-06 ENCOUNTER — NON-APPOINTMENT (OUTPATIENT)
Age: 69
End: 2024-05-06

## 2024-11-06 ENCOUNTER — NON-APPOINTMENT (OUTPATIENT)
Age: 69
End: 2024-11-06

## 2024-11-18 ENCOUNTER — LABORATORY RESULT (OUTPATIENT)
Age: 69
End: 2024-11-18

## 2024-11-18 ENCOUNTER — APPOINTMENT (OUTPATIENT)
Age: 69
End: 2024-11-18
Payer: MEDICARE

## 2024-11-18 VITALS
SYSTOLIC BLOOD PRESSURE: 132 MMHG | HEIGHT: 65 IN | OXYGEN SATURATION: 95 % | HEART RATE: 79 BPM | TEMPERATURE: 97.9 F | RESPIRATION RATE: 15 BRPM | DIASTOLIC BLOOD PRESSURE: 77 MMHG | WEIGHT: 148 LBS | BODY MASS INDEX: 24.66 KG/M2

## 2024-11-18 DIAGNOSIS — M81.6 LOCALIZED OSTEOPOROSIS [LEQUESNE]: ICD-10-CM

## 2024-11-18 DIAGNOSIS — E78.5 HYPERLIPIDEMIA, UNSPECIFIED: ICD-10-CM

## 2024-11-18 DIAGNOSIS — K21.9 GASTRO-ESOPHAGEAL REFLUX DISEASE W/OUT ESOPHAGITIS: ICD-10-CM

## 2024-11-18 DIAGNOSIS — G43.909 MIGRAINE, UNSPECIFIED, NOT INTRACTABLE, W/OUT STATUS MIGRAINOSUS: ICD-10-CM

## 2024-11-18 PROCEDURE — G2211 COMPLEX E/M VISIT ADD ON: CPT

## 2024-11-18 PROCEDURE — 99214 OFFICE O/P EST MOD 30 MIN: CPT

## 2024-11-18 PROCEDURE — 36415 COLL VENOUS BLD VENIPUNCTURE: CPT

## 2024-11-18 RX ORDER — RIZATRIPTAN BENZOATE 10 MG/1
10 TABLET, ORALLY DISINTEGRATING ORAL
Qty: 30 | Refills: 5 | Status: ACTIVE | COMMUNITY
Start: 2024-11-18 | End: 1900-01-01

## 2024-11-23 PROBLEM — R31.9 HEMATURIA: Status: ACTIVE | Noted: 2024-11-23

## 2024-11-23 LAB
ALBUMIN SERPL ELPH-MCNC: 4.6 G/DL
ALP BLD-CCNC: 70 U/L
ALT SERPL-CCNC: 11 U/L
ANION GAP SERPL CALC-SCNC: 11 MMOL/L
APPEARANCE: CLEAR
AST SERPL-CCNC: 18 U/L
BASOPHILS # BLD AUTO: 0.03 K/UL
BASOPHILS NFR BLD AUTO: 0.6 %
BILIRUB SERPL-MCNC: 0.2 MG/DL
BILIRUBIN URINE: NEGATIVE
BLOOD URINE: ABNORMAL
BUN SERPL-MCNC: 11 MG/DL
CALCIUM SERPL-MCNC: 9.5 MG/DL
CHLORIDE SERPL-SCNC: 105 MMOL/L
CHOLEST SERPL-MCNC: 211 MG/DL
CO2 SERPL-SCNC: 25 MMOL/L
COLOR: YELLOW
CREAT SERPL-MCNC: 0.59 MG/DL
EGFR: 96 ML/MIN/1.73M2
EOSINOPHIL # BLD AUTO: 0.11 K/UL
EOSINOPHIL NFR BLD AUTO: 2.1 %
ESTIMATED AVERAGE GLUCOSE: 108 MG/DL
GLUCOSE QUALITATIVE U: NEGATIVE MG/DL
GLUCOSE SERPL-MCNC: 87 MG/DL
HBA1C MFR BLD HPLC: 5.4 %
HCT VFR BLD CALC: 34.5 %
HDLC SERPL-MCNC: 52 MG/DL
HGB BLD-MCNC: 11 G/DL
IMM GRANULOCYTES NFR BLD AUTO: 0.2 %
KETONES URINE: NEGATIVE MG/DL
LDLC SERPL CALC-MCNC: 137 MG/DL
LEUKOCYTE ESTERASE URINE: ABNORMAL
LYMPHOCYTES # BLD AUTO: 2.05 K/UL
LYMPHOCYTES NFR BLD AUTO: 39.4 %
MAN DIFF?: NORMAL
MCHC RBC-ENTMCNC: 20.8 PG
MCHC RBC-ENTMCNC: 31.9 G/DL
MCV RBC AUTO: 65.3 FL
MONOCYTES # BLD AUTO: 0.32 K/UL
MONOCYTES NFR BLD AUTO: 6.2 %
NEUTROPHILS # BLD AUTO: 2.68 K/UL
NEUTROPHILS NFR BLD AUTO: 51.5 %
NITRITE URINE: NEGATIVE
NONHDLC SERPL-MCNC: 159 MG/DL
PH URINE: 7
PLATELET # BLD AUTO: 251 K/UL
POTASSIUM SERPL-SCNC: 4.4 MMOL/L
PROT SERPL-MCNC: 7 G/DL
PROTEIN URINE: NEGATIVE MG/DL
RBC # BLD: 5.28 M/UL
RBC # FLD: 15.8 %
SODIUM SERPL-SCNC: 140 MMOL/L
SPECIFIC GRAVITY URINE: 1.02
TRIGL SERPL-MCNC: 123 MG/DL
TSH SERPL-ACNC: 1.26 UIU/ML
UROBILINOGEN URINE: 0.2 MG/DL
VIT B12 SERPL-MCNC: 498 PG/ML
WBC # FLD AUTO: 5.2 K/UL

## 2024-12-03 ENCOUNTER — APPOINTMENT (OUTPATIENT)
Age: 69
End: 2024-12-03
Payer: MEDICARE

## 2024-12-03 VITALS
SYSTOLIC BLOOD PRESSURE: 129 MMHG | OXYGEN SATURATION: 96 % | DIASTOLIC BLOOD PRESSURE: 82 MMHG | HEIGHT: 65 IN | WEIGHT: 146 LBS | BODY MASS INDEX: 24.32 KG/M2 | TEMPERATURE: 96.3 F | RESPIRATION RATE: 15 BRPM | HEART RATE: 86 BPM

## 2024-12-03 DIAGNOSIS — R31.29 OTHER MICROSCOPIC HEMATURIA: ICD-10-CM

## 2024-12-03 PROCEDURE — 99204 OFFICE O/P NEW MOD 45 MIN: CPT

## 2024-12-03 PROCEDURE — G2211 COMPLEX E/M VISIT ADD ON: CPT

## 2024-12-10 ENCOUNTER — NON-APPOINTMENT (OUTPATIENT)
Age: 69
End: 2024-12-10

## 2024-12-18 ENCOUNTER — NON-APPOINTMENT (OUTPATIENT)
Age: 69
End: 2024-12-18

## 2025-01-08 ENCOUNTER — NON-APPOINTMENT (OUTPATIENT)
Age: 70
End: 2025-01-08

## 2025-01-08 ENCOUNTER — APPOINTMENT (OUTPATIENT)
Age: 70
End: 2025-01-08
Payer: MEDICARE

## 2025-01-08 VITALS
OXYGEN SATURATION: 99 % | RESPIRATION RATE: 15 BRPM | BODY MASS INDEX: 24.32 KG/M2 | HEART RATE: 78 BPM | WEIGHT: 146 LBS | DIASTOLIC BLOOD PRESSURE: 81 MMHG | SYSTOLIC BLOOD PRESSURE: 127 MMHG | HEIGHT: 65 IN | TEMPERATURE: 97.6 F

## 2025-01-08 DIAGNOSIS — R31.29 OTHER MICROSCOPIC HEMATURIA: ICD-10-CM

## 2025-01-08 DIAGNOSIS — Z87.448 PERSONAL HISTORY OF OTHER DISEASES OF URINARY SYSTEM: ICD-10-CM

## 2025-01-08 PROCEDURE — 99213 OFFICE O/P EST LOW 20 MIN: CPT | Mod: 25

## 2025-01-08 PROCEDURE — 52000 CYSTOURETHROSCOPY: CPT

## 2025-01-11 PROBLEM — Z87.448 HISTORY OF HEMATURIA: Status: RESOLVED | Noted: 2024-11-23 | Resolved: 2025-01-11

## 2025-01-23 DIAGNOSIS — R31.29 OTHER MICROSCOPIC HEMATURIA: ICD-10-CM

## 2025-02-28 LAB
APPEARANCE: CLEAR
BACTERIA UR CULT: NORMAL
BACTERIA: NEGATIVE /HPF
BILIRUBIN URINE: NEGATIVE
BLOOD URINE: NEGATIVE
CAST: 0 /LPF
COLOR: YELLOW
EPITHELIAL CELLS: 0 /HPF
GLUCOSE QUALITATIVE U: NEGATIVE MG/DL
KETONES URINE: NEGATIVE MG/DL
LEUKOCYTE ESTERASE URINE: NEGATIVE
MICROSCOPIC-UA: NORMAL
NITRITE URINE: NEGATIVE
PH URINE: 6.5
PROTEIN URINE: NEGATIVE MG/DL
RED BLOOD CELLS URINE: 3 /HPF
SPECIFIC GRAVITY URINE: 1.01
UROBILINOGEN URINE: 0.2 MG/DL
WHITE BLOOD CELLS URINE: 1 /HPF

## 2025-04-28 ENCOUNTER — LABORATORY RESULT (OUTPATIENT)
Age: 70
End: 2025-04-28

## 2025-04-28 ENCOUNTER — APPOINTMENT (OUTPATIENT)
Age: 70
End: 2025-04-28
Payer: MEDICARE

## 2025-04-28 VITALS
WEIGHT: 142 LBS | BODY MASS INDEX: 23.66 KG/M2 | OXYGEN SATURATION: 98 % | HEIGHT: 65 IN | RESPIRATION RATE: 14 BRPM | DIASTOLIC BLOOD PRESSURE: 76 MMHG | SYSTOLIC BLOOD PRESSURE: 135 MMHG | HEART RATE: 80 BPM | TEMPERATURE: 97.8 F

## 2025-04-28 DIAGNOSIS — K21.9 GASTRO-ESOPHAGEAL REFLUX DISEASE W/OUT ESOPHAGITIS: ICD-10-CM

## 2025-04-28 DIAGNOSIS — M81.6 LOCALIZED OSTEOPOROSIS [LEQUESNE]: ICD-10-CM

## 2025-04-28 DIAGNOSIS — E78.5 HYPERLIPIDEMIA, UNSPECIFIED: ICD-10-CM

## 2025-04-28 DIAGNOSIS — M54.9 DORSALGIA, UNSPECIFIED: ICD-10-CM

## 2025-04-28 PROCEDURE — 99214 OFFICE O/P EST MOD 30 MIN: CPT | Mod: 25

## 2025-04-28 RX ORDER — MELOXICAM 15 MG/1
15 TABLET ORAL
Qty: 30 | Refills: 0 | Status: ACTIVE | COMMUNITY
Start: 2025-04-28

## 2025-04-28 RX ORDER — MELOXICAM 10 MG/1
10 CAPSULE ORAL
Qty: 30 | Refills: 0 | Status: DISCONTINUED | COMMUNITY
Start: 2025-04-28 | End: 2025-04-28

## 2025-04-30 ENCOUNTER — NON-APPOINTMENT (OUTPATIENT)
Age: 70
End: 2025-04-30

## 2025-05-05 LAB
ALBUMIN SERPL ELPH-MCNC: 4.2 G/DL
ALP BLD-CCNC: 78 U/L
ALT SERPL-CCNC: 17 U/L
ANION GAP SERPL CALC-SCNC: 13 MMOL/L
AST SERPL-CCNC: 21 U/L
BASOPHILS # BLD AUTO: 0.05 K/UL
BASOPHILS NFR BLD AUTO: 0.9 %
BILIRUB SERPL-MCNC: 0.3 MG/DL
BUN SERPL-MCNC: 16 MG/DL
CALCIUM SERPL-MCNC: 9.6 MG/DL
CHLORIDE SERPL-SCNC: 106 MMOL/L
CHOLEST SERPL-MCNC: 227 MG/DL
CO2 SERPL-SCNC: 23 MMOL/L
CREAT SERPL-MCNC: 0.57 MG/DL
EGFRCR SERPLBLD CKD-EPI 2021: 97 ML/MIN/1.73M2
EOSINOPHIL # BLD AUTO: 0.14 K/UL
EOSINOPHIL NFR BLD AUTO: 2.6 %
ESTIMATED AVERAGE GLUCOSE: 111 MG/DL
GLUCOSE SERPL-MCNC: 85 MG/DL
HBA1C MFR BLD HPLC: 5.5 %
HCT VFR BLD CALC: 32 %
HDLC SERPL-MCNC: 55 MG/DL
HGB BLD-MCNC: 9.8 G/DL
IMM GRANULOCYTES NFR BLD AUTO: 0.2 %
LDLC SERPL-MCNC: 160 MG/DL
LYMPHOCYTES # BLD AUTO: 2.29 K/UL
LYMPHOCYTES NFR BLD AUTO: 43.3 %
MAN DIFF?: NORMAL
MCHC RBC-ENTMCNC: 20.7 PG
MCHC RBC-ENTMCNC: 30.6 G/DL
MCV RBC AUTO: 67.5 FL
MONOCYTES # BLD AUTO: 0.32 K/UL
MONOCYTES NFR BLD AUTO: 6 %
NEUTROPHILS # BLD AUTO: 2.48 K/UL
NEUTROPHILS NFR BLD AUTO: 47 %
NONHDLC SERPL-MCNC: 172 MG/DL
PLATELET # BLD AUTO: 281 K/UL
POTASSIUM SERPL-SCNC: 4.8 MMOL/L
PROT SERPL-MCNC: 7 G/DL
RBC # BLD: 4.74 M/UL
RBC # FLD: 17.2 %
SODIUM SERPL-SCNC: 143 MMOL/L
TRIGL SERPL-MCNC: 72 MG/DL
WBC # FLD AUTO: 5.29 K/UL